# Patient Record
Sex: MALE | Race: WHITE | NOT HISPANIC OR LATINO | Employment: FULL TIME | URBAN - NONMETROPOLITAN AREA
[De-identification: names, ages, dates, MRNs, and addresses within clinical notes are randomized per-mention and may not be internally consistent; named-entity substitution may affect disease eponyms.]

---

## 2023-10-27 DIAGNOSIS — M54.50 LOW BACK PAIN, UNSPECIFIED BACK PAIN LATERALITY, UNSPECIFIED CHRONICITY, UNSPECIFIED WHETHER SCIATICA PRESENT: Primary | ICD-10-CM

## 2024-06-12 ENCOUNTER — OFFICE VISIT (OUTPATIENT)
Dept: PAIN MEDICINE | Facility: CLINIC | Age: 34
End: 2024-06-12
Payer: OTHER MISCELLANEOUS

## 2024-06-12 VITALS — WEIGHT: 172 LBS | HEIGHT: 73 IN | BODY MASS INDEX: 22.8 KG/M2

## 2024-06-12 DIAGNOSIS — M47.817 LUMBOSACRAL SPONDYLOSIS WITHOUT MYELOPATHY: Primary | ICD-10-CM

## 2024-06-12 PROCEDURE — 99203 OFFICE O/P NEW LOW 30 MIN: CPT | Performed by: STUDENT IN AN ORGANIZED HEALTH CARE EDUCATION/TRAINING PROGRAM

## 2024-06-12 RX ORDER — GABAPENTIN 100 MG/1
100 CAPSULE ORAL 2 TIMES DAILY
Qty: 60 CAPSULE | Refills: 1 | Status: SHIPPED | OUTPATIENT
Start: 2024-06-12

## 2024-06-12 NOTE — PROGRESS NOTES
Referring Physician: Angie Lora APRN  [address] [facility] [place] [address]    Primary Physician: Angie Lora APRN    CHIEF COMPLAINT or REASON FOR VISIT: Back Pain (New patient)      Initial history of present illness on 06/12/2024:  Mr. Aydin Lew is 34 y.o. male who presents as a new patient referral for evaluation treatment of chronic left-sided low back pain with radiation to left thigh.  He recently thought he had a hip injury when moving items by a dumpster in October 2023.  He continues to describe left-sided low back pain.  He is never had any spine surgery or intervention.  He has been evaluated by spine surgery, Dr. Ewing, who did not recommend surgery.  He denies any right-sided symptoms.  He has done physical therapy with minimal benefit but the electrical stimulation device is somewhat helpful.  He has been taking ibuprofen with modest benefit.  He does not want to have any kind of injection or procedure as he is concerned about the potential risks.  Patient denies any bowel or bladder dysfunction, lower extremity weakness, new onset saddle anesthesia or unexplained weight loss.       Interval history:    Interventions:      Objective Pain Scoring:   BRIEF PAIN INVENTORY:  Total score:   Pain Score    06/12/24 1125   PainSc:   5   PainLoc: Back      PHQ-2: PHQ-2 Total Score: 4  PHQ-9: PHQ-9: Brief Depression Severity Measure Score: 8  Opioid Risk Tool:         Review of Systems:   ROS negative except as otherwise noted     Past Medical History:   No past medical history on file.      Past Surgical History:   No past surgical history on file.      Family History   No family history on file.      Social History   Social History     Socioeconomic History   • Marital status: Single   Tobacco Use   • Smoking status: Every Day     Current packs/day: 1.00     Average packs/day: 1 pack/day for 13.0 years (13.0 ttl pk-yrs)     Types: Cigarettes     Start date: 6/12/2011   • Smokeless tobacco: Never  "  Vaping Use   • Vaping status: Former        Medications:     Current Outpatient Medications:   •  gabapentin (NEURONTIN) 100 MG capsule, Take 1 capsule by mouth 2 (Two) Times a Day., Disp: 60 capsule, Rfl: 1        Physical Exam:     Vitals:    06/12/24 1125   Weight: 78 kg (172 lb)   Height: 185.4 cm (73\")   PainSc:   5   PainLoc: Back        General: Alert and oriented, No acute distress.   HEENT: Normocephalic, atraumatic.   Cardiovascular: No gross edema  Respiratory: Respirations are non-labored  Lumbar Spine:   No masses or atrophy  Range of motion - Flexion normal. Extension normal.    Facet Loading: Positive left  Facet Palpation -tender left  Casey finger/Gaenslen's/Alex's/COLBY/Thigh thrust -   Straight leg raise/slump test: Negative bilaterally      Motor Exam:    Strength: Rate on 1-5 scale Right Left    C5-Elbow flexion, Deltoid 5/5  5/5    C6-Wrist extension 5/5  5/5    C7- Elbow / finger extension 5/5  5/5    C8- Finger flexion 5/5  5/5    T1- Intrinsics hand 5/5  5/5    Strength: Rate on 1-5 scale Right Left    L1/2- hip flexion 5/5  5/5    L3- knee extension 5/5  5/5    L4- ankle dorsiflexion 5/5  5/5    L5- great toe extension 5/5  5/5    S1- ankle plantarflexion 5/5  5/5    Sensory Exam: Full and equal sensation to light touch throughout.    Neurologic: Cranial Nerves II-XII are grossly intact.     Psychiatric: Cooperative.   Gait: Normal   Assistive Devices: None    Imaging Studies:   No results found for this or any previous visit.        Independent review of radiographic imaging:     Impression & Plan:       06/12/2024: Aydin Lew is a 34 y.o. male with no significant past medical history significant who presents to the pain clinic for evaluation and treatment of chronic left-sided low back pain.  I do not have images for personal interpretation however lumbar MRI indicates primarily right-sided neuroforaminal stenosis.  Evaluation was consistent with a left-sided facet " spondylosis.  He would like to avoid any kind of procedure.  We can try gabapentin for his referred radicular type pain.  Additionally counseled him to use acetaminophen instead of ibuprofen.    1. Lumbosacral spondylosis without myelopathy        PLAN:  1. Medication Recommendations: Recommend Voltaren topical, NSAIDs, Tylenol.  Can trial turmeric 500 mg twice daily if NSAID contraindicated.  -Start gabapentin 100 mg 2 times daily 60 pills 1 refill  -As part of this patient's treatment plan, patient will be prescribed controlled substances. The patient has been made aware of appropriate use of such medications, including potential risk of somnolence, limited ability to drive and /or work safely, and potential for dependence or overdose. It has also been made clear that these medications are for use by this patient only, without concomitant use of alcohol or other substances unless prescribed.Controlled substance status of medication discussed with patient, discussed risks of medication including abuse potential and diversion potential and need to follow up for reevaluation appointment in order to receive further refills.  Min was reviewed and compliant.    2. Physical Therapy: Continue HEP    3. Psychological: defer    4. Complementary and alternative (CAM) Therapies:     5. Labs/Diagnostic studies: None indicated     6. Imaging: None indicated     7. Interventions: Can consider LMBB.  Patient declines at this time.    8. Referrals: None indicated     9. Records: PCP note reviewed    10. Lifestyle goals:    Follow-up 2 months      [facility]  Vince Camarillo MD          Quality Metrics:    Aydin Lew reports a pain score of 5.  Given his pain assessment as noted, treatment options were discussed and the following options were decided upon as a follow-up plan to address the patient's pain: continuation of current treatment plan for pain.

## 2024-08-14 ENCOUNTER — OFFICE VISIT (OUTPATIENT)
Dept: PAIN MEDICINE | Facility: CLINIC | Age: 34
End: 2024-08-14
Payer: OTHER MISCELLANEOUS

## 2024-08-14 VITALS — HEIGHT: 73 IN | WEIGHT: 176.1 LBS | BODY MASS INDEX: 23.34 KG/M2

## 2024-08-14 DIAGNOSIS — Z51.81 THERAPEUTIC DRUG MONITORING: Primary | ICD-10-CM

## 2024-08-14 DIAGNOSIS — M47.817 LUMBOSACRAL SPONDYLOSIS WITHOUT MYELOPATHY: Primary | ICD-10-CM

## 2024-08-14 PROCEDURE — 99213 OFFICE O/P EST LOW 20 MIN: CPT

## 2024-08-14 RX ORDER — HYDROCODONE BITARTRATE AND ACETAMINOPHEN 5; 325 MG/1; MG/1
1 TABLET ORAL AS NEEDED
COMMUNITY

## 2024-08-14 RX ORDER — PREGABALIN 25 MG/1
25 CAPSULE ORAL 2 TIMES DAILY
Qty: 60 CAPSULE | Refills: 1 | Status: SHIPPED | OUTPATIENT
Start: 2024-08-14

## 2024-08-14 RX ORDER — IBUPROFEN 200 MG
200 TABLET ORAL AS NEEDED
COMMUNITY

## 2024-08-14 NOTE — TELEPHONE ENCOUNTER
Discontinue gabapentin   start pregabalin 25 mg twice daily 60 pills #1 refill  Min reviewed and compliant  Controlled substance agreement signed in office  Pending UDS   appropriate follow-up visit scheduled

## 2024-08-14 NOTE — PROGRESS NOTES
Referring Physician: No referring provider defined for this encounter.    Primary Physician: Angie Lora APRN    CHIEF COMPLAINT or REASON FOR VISIT: Follow-up and Back Pain      Initial history of present illness on 06/12/2024:  Mr. Aydin Lew is 34 y.o. male who presents as a new patient referral for evaluation treatment of chronic left-sided low back pain with radiation to left thigh.  He recently thought he had a hip injury when moving items by a dumpster in October 2023.  He continues to describe left-sided low back pain.  He is never had any spine surgery or intervention.  He has been evaluated by spine surgery, Dr. Ewing, who did not recommend surgery.  He denies any right-sided symptoms.  He has done physical therapy with minimal benefit but the electrical stimulation device is somewhat helpful.  He has been taking ibuprofen with modest benefit.  He does not want to have any kind of injection or procedure as he is concerned about the potential risks.  Patient denies any bowel or bladder dysfunction, lower extremity weakness, new onset saddle anesthesia or unexplained weight loss.       Interval history:  Patient returns to clinic today.  He was prescribed gabapentin at his last office visit however he reports he is not able to tolerate this medication due to cognitive side effects.  He has subsequently stopped taking it.  Continues to complain of chronic left-sided low back pain. He denies any new injuries or events since previous appointment.  We discussed a left LMBB and subsequent RFA.  Patient reports he is not interested in any procedures at this time.  Patient denies any bowel or bladder dysfunction, lower extremity weakness, new onset saddle anesthesia or unexplained weight lo    Interventions:      Objective Pain Scoring:   BRIEF PAIN INVENTORY:  Total score:   Pain Score    08/14/24 1456   PainSc:   6   PainLoc: Back      PHQ-2: PHQ-2 Total Score: 3  PHQ-9: PHQ-9: Brief Depression Severity  "Measure Score: 12  Opioid Risk Tool:         Review of Systems:   ROS negative except as otherwise noted     Past Medical History:   History reviewed. No pertinent past medical history.      Past Surgical History:   History reviewed. No pertinent surgical history.      Family History   History reviewed. No pertinent family history.      Social History   Social History     Socioeconomic History   • Marital status: Single   Tobacco Use   • Smoking status: Every Day     Current packs/day: 1.00     Average packs/day: 1 pack/day for 13.2 years (13.2 ttl pk-yrs)     Types: Cigarettes     Start date: 6/12/2011   • Smokeless tobacco: Never   Vaping Use   • Vaping status: Former        Medications:     Current Outpatient Medications:   •  HYDROcodone-acetaminophen (NORCO) 5-325 MG per tablet, Take 1 tablet by mouth As Needed for Mild Pain or Moderate Pain., Disp: , Rfl:   •  ibuprofen (ADVIL,MOTRIN) 200 MG tablet, Take 1 tablet by mouth As Needed for Mild Pain., Disp: , Rfl:         Physical Exam:     Vitals:    08/14/24 1456   Weight: 79.9 kg (176 lb 1.6 oz)   Height: 185.4 cm (72.99\")   PainSc:   6   PainLoc: Back        General: Alert and oriented, No acute distress.   HEENT: Normocephalic, atraumatic.   Cardiovascular: No gross edema  Respiratory: Respirations are non-labored  Lumbar Spine:   No masses or atrophy  Range of motion - Flexion normal. Extension normal.    Facet Loading: Positive left  Facet Palpation -tender left  Casey finger/Gaenslen's/Alex's/COLBY/Thigh thrust -   Straight leg raise/slump test: Negative bilaterally      Motor Exam:    Strength: Rate on 1-5 scale Right Left    C5-Elbow flexion, Deltoid 5/5  5/5    C6-Wrist extension 5/5  5/5    C7- Elbow / finger extension 5/5  5/5    C8- Finger flexion 5/5  5/5    T1- Intrinsics hand 5/5  5/5    Strength: Rate on 1-5 scale Right Left    L1/2- hip flexion 5/5  5/5    L3- knee extension 5/5  5/5    L4- ankle dorsiflexion 5/5  5/5    L5- great " toe extension 5/5  5/5    S1- ankle plantarflexion 5/5  5/5    Sensory Exam: Full and equal sensation to light touch throughout.    Neurologic: Cranial Nerves II-XII are grossly intact.     Psychiatric: Cooperative.   Gait: Normal   Assistive Devices: None    Imaging Studies:   No results found for this or any previous visit.        Independent review of radiographic imaging:     Impression & Plan:       06/12/2024: Aydin Lew is a 34 y.o. male with no significant past medical history significant who presents to the pain clinic for evaluation and treatment of chronic left-sided low back pain.  I do not have images for personal interpretation however lumbar MRI indicates primarily right-sided neuroforaminal stenosis.  Evaluation was consistent with a left-sided facet spondylosis.  He would like to avoid any kind of procedure.  We can try gabapentin for his referred radicular type pain.  Additionally counseled him to use acetaminophen instead of ibuprofen.  8/14/2024: Discontinue gabapentin.  Patient not interested in procedures at this time.  Will start low-dose pregabalin twice daily    1. Lumbosacral spondylosis without myelopathy          PLAN:  1. Medication Recommendations: Recommend Voltaren topical, NSAIDs, Tylenol.  Can trial turmeric 500 mg twice daily if NSAID contraindicated.  -Discontinue gabapentin.  Will contact pharmacy to cancel this medication  -Start pregabalin 25 mg twice daily, 60 pills, #1 refill  -As part of this patient's treatment plan, patient will be prescribed controlled substances. The patient has been made aware of appropriate use of such medications, including potential risk of somnolence, limited ability to drive and /or work safely, and potential for dependence or overdose. It has also been made clear that these medications are for use by this patient only, without concomitant use of alcohol or other substances unless prescribed.Controlled substance status of medication discussed  with patient, discussed risks of medication including abuse potential and diversion potential and need to follow up for reevaluation appointment in order to receive further refills.  Min was reviewed and compliant.    2. Physical Therapy: Continue HEP    3. Psychological: defer    4. Complementary and alternative (CAM) Therapies:     5. Labs/Diagnostic studies: Obtain compliance UDS    6. Imaging: None indicated     7. Interventions: Can consider LMBB.  Patient declines at this time.    8. Referrals: None indicated     9. Records: PCP note reviewed    10. Lifestyle goals:    Follow-up 2 months for medication management in [facility]  Maria Luz Ji PA-C          Quality Metrics:    Aydin Lew reports a pain score of 6.  Given his pain assessment as noted, treatment options were discussed and the following options were decided upon as a follow-up plan to address the patient's pain: continuation of current treatment plan for pain.

## 2024-08-15 ENCOUNTER — TELEPHONE (OUTPATIENT)
Dept: PAIN MEDICINE | Facility: CLINIC | Age: 34
End: 2024-08-15
Payer: OTHER MISCELLANEOUS

## 2024-08-22 ENCOUNTER — CLINICAL SUPPORT (OUTPATIENT)
Dept: FAMILY MEDICINE CLINIC | Facility: CLINIC | Age: 34
End: 2024-08-22
Payer: OTHER MISCELLANEOUS

## 2024-08-22 DIAGNOSIS — Z79.899 ENCOUNTER FOR LONG-TERM (CURRENT) USE OF OTHER MEDICATIONS: Primary | ICD-10-CM

## 2024-08-22 LAB
POC AMPHETAMINES: NEGATIVE
POC BARBITURATES: NEGATIVE
POC BENZODIAZEPHINES: NEGATIVE
POC COCAINE: NEGATIVE
POC METHADONE: NEGATIVE
POC METHAMPHETAMINE SCREEN URINE: NEGATIVE
POC OPIATES: NEGATIVE
POC OXYCODONE: NEGATIVE
POC PHENCYCLIDINE: NEGATIVE
POC PROPOXYPHENE: NEGATIVE
POC THC: POSITIVE
POC TRICYCLIC ANTIDEPRESSANTS: NEGATIVE

## 2024-11-15 ENCOUNTER — OFFICE VISIT (OUTPATIENT)
Dept: PAIN MEDICINE | Facility: CLINIC | Age: 34
End: 2024-11-15
Payer: OTHER MISCELLANEOUS

## 2024-11-15 VITALS — BODY MASS INDEX: 23.34 KG/M2 | WEIGHT: 176.1 LBS | HEIGHT: 73 IN

## 2024-11-15 DIAGNOSIS — M47.817 LUMBOSACRAL SPONDYLOSIS WITHOUT MYELOPATHY: Primary | ICD-10-CM

## 2024-11-15 PROCEDURE — 99213 OFFICE O/P EST LOW 20 MIN: CPT

## 2024-11-15 NOTE — PROGRESS NOTES
Referring Physician: No referring provider defined for this encounter.    Primary Physician: Angie Lora APRN    CHIEF COMPLAINT or REASON FOR VISIT: Follow-up, Back Pain, and Med Management (Not taking Pregabalin due to side effects. )      Initial history of present illness on 06/12/2024:  Mr. Aydin Lew is 34 y.o. male who presents as a new patient referral for evaluation treatment of chronic left-sided low back pain with radiation to left thigh.  He recently thought he had a hip injury when moving items by a dumpster in October 2023.  He continues to describe left-sided low back pain.  He is never had any spine surgery or intervention.  He has been evaluated by spine surgery, Dr. Ewing, who did not recommend surgery.  He denies any right-sided symptoms.  He has done physical therapy with minimal benefit but the electrical stimulation device is somewhat helpful.  He has been taking ibuprofen with modest benefit.  He does not want to have any kind of injection or procedure as he is concerned about the potential risks.  Patient denies any bowel or bladder dysfunction, lower extremity weakness, new onset saddle anesthesia or unexplained weight loss.       Interval history:  Patient returns to clinic today.  He continues to complain of left-sided low back pain with radiation to the left thigh.  He denies any new injuries or events since his previous appointment.  He occasionally takes pregabalin with minimal relief.  He has participated in physical therapy which provided some relief.  Discussed multiple treatment options today including potential lumbar epidural steroid injection, continuing conservative measures with physical therapy, lumbar medial branch blocks and subsequent ablation.  Patient is apprehensive to undergo any procedure/spinal injections at this time.  He expresses interest in continuing physical therapy.  Unfortunately, his UDS did reveal positive THC.  He states he uses THC for ADHD and  "Tourette's.  I did explain to him we are unable to offer additional refills of pregabalin due to current THC use.  He voiced understanding.    Interventions:      Objective Pain Scoring:   BRIEF PAIN INVENTORY:  Total score:   Pain Score    11/15/24 1242   PainSc:   5   PainLoc: Back      PHQ-2:    PHQ-9:    Opioid Risk Tool:         Review of Systems:   ROS negative except as otherwise noted     Past Medical History:   Past Medical History:   Diagnosis Date   • Low back pain 10/3/23    Lower back hurts aches burns all the time         Past Surgical History:   History reviewed. No pertinent surgical history.      Family History   History reviewed. No pertinent family history.      Social History   Social History     Socioeconomic History   • Marital status: Single   Tobacco Use   • Smoking status: Every Day     Current packs/day: 1.00     Average packs/day: 1 pack/day for 13.4 years (13.4 ttl pk-yrs)     Types: Cigarettes     Start date: 6/12/2011   • Smokeless tobacco: Never   • Tobacco comments:     Quit for 5 years and started back about 6 years ago   Vaping Use   • Vaping status: Former   Substance and Sexual Activity   • Alcohol use: Yes     Alcohol/week: 8.0 standard drinks of alcohol     Types: 8 Shots of liquor per week     Comment: Alot of weeks none. Weekend drinker when i do.   • Drug use: Never   • Sexual activity: Yes     Partners: Female     Birth control/protection: Birth control pill        Medications:     Current Outpatient Medications:   •  ibuprofen (ADVIL,MOTRIN) 200 MG tablet, Take 1 tablet by mouth As Needed for Mild Pain., Disp: , Rfl:         Physical Exam:     Vitals:    11/15/24 1242   Weight: 79.9 kg (176 lb 1.6 oz)   Height: 185.4 cm (72.99\")   PainSc:   5   PainLoc: Back        General: Alert and oriented, No acute distress.   HEENT: Normocephalic, atraumatic.   Cardiovascular: No gross edema  Respiratory: Respirations are non-labored  Lumbar Spine:   No masses or atrophy  Range of " motion - Flexion normal. Extension normal.    Facet Loading: Positive left  Facet Palpation -tender left  Casey finger/Gaenslen's/Alex's/COLBY/Thigh thrust -   Straight leg raise/slump test: Negative bilaterally      Motor Exam:    Strength: Rate on 1-5 scale Right Left    C5-Elbow flexion, Deltoid 5/5  5/5    C6-Wrist extension 5/5  5/5    C7- Elbow / finger extension 5/5  5/5    C8- Finger flexion 5/5  5/5    T1- Intrinsics hand 5/5  5/5    Strength: Rate on 1-5 scale Right Left    L1/2- hip flexion 5/5  5/5    L3- knee extension 5/5  5/5    L4- ankle dorsiflexion 5/5  5/5    L5- great toe extension 5/5  5/5    S1- ankle plantarflexion 5/5  5/5    Sensory Exam: Full and equal sensation to light touch throughout.    Neurologic: Cranial Nerves II-XII are grossly intact.     Psychiatric: Cooperative.   Gait: Normal   Assistive Devices: None    Imaging Studies:   No results found for this or any previous visit.        Independent review of radiographic imaging:     Impression & Plan:       06/12/2024: Aydin Lew is a 34 y.o. male with no significant past medical history significant who presents to the pain clinic for evaluation and treatment of chronic left-sided low back pain.  I do not have images for personal interpretation however lumbar MRI indicates primarily right-sided neuroforaminal stenosis.  Evaluation was consistent with a left-sided facet spondylosis.  He would like to avoid any kind of procedure.  We can try gabapentin for his referred radicular type pain.  Additionally counseled him to use acetaminophen instead of ibuprofen.  8/14/2024: Discontinue gabapentin.  Patient not interested in procedures at this time.  Will start low-dose pregabalin twice daily  11/15/2024: Discontinue pregabalin due to current THC use.  Not a candidate for controlled substances at this time.  Patient politely declines any procedures/interventions.  Will continue physical therapy    1. Lumbosacral spondylosis  without myelopathy            PLAN:  1. Medication Recommendations: Recommend Voltaren topical, NSAIDs, Tylenol.  Can trial turmeric 500 mg twice daily if NSAID contraindicated.  -Discontinue pregabalin.  Patient not a candidate for controlled substance at this time due to current THC use    2. Physical Therapy: New PT referral    3. Psychological: defer    4. Complementary and alternative (CAM) Therapies:     5. Labs/Diagnostic studies: UDS from 8/22/2024 reveals positive THC    6. Imaging: None indicated     7. Interventions: Can consider LMBB, LESI.  Patient declines at this time.    8. Referrals: None indicated     9. Records:     10. Lifestyle goals:    Follow-up 6 months      [facility]  Maria [place] Ji PA-C          Quality Metrics:    Aydin Lew reports a pain score of 5.  Given his pain assessment as noted, treatment options were discussed and the following options were decided upon as a follow-up plan to address the patient's pain: continuation of current treatment plan for pain.     Patient screened positive for depression based on a PHQ-9 score of 13 on 11/15/2024. Follow-up recommendations include: PCP managing depression.

## 2025-05-23 ENCOUNTER — OFFICE VISIT (OUTPATIENT)
Dept: PAIN MEDICINE | Facility: CLINIC | Age: 35
End: 2025-05-23
Payer: OTHER MISCELLANEOUS

## 2025-05-23 VITALS — BODY MASS INDEX: 22.66 KG/M2 | WEIGHT: 171 LBS | HEIGHT: 73 IN

## 2025-05-23 DIAGNOSIS — M47.817 LUMBOSACRAL SPONDYLOSIS WITHOUT MYELOPATHY: Primary | ICD-10-CM

## 2025-05-23 PROCEDURE — 99213 OFFICE O/P EST LOW 20 MIN: CPT

## 2025-05-23 RX ORDER — PREGABALIN 25 MG/1
CAPSULE ORAL
COMMUNITY
Start: 2025-05-05

## 2025-05-23 RX ORDER — LISDEXAMFETAMINE DIMESYLATE 60 MG/1
CAPSULE ORAL
COMMUNITY
Start: 2025-05-05

## 2025-05-23 NOTE — PROGRESS NOTES
Referring Physician: No referring provider defined for this encounter.    Primary Physician: Provider, No Known    CHIEF COMPLAINT or REASON FOR VISIT: Back Pain and Follow-up      Initial history of present illness on 06/12/2024:  Mr. Aydin Lew is 35 y.o. male who presents as a new patient referral for evaluation treatment of chronic left-sided low back pain with radiation to left thigh.  He recently thought he had a hip injury when moving items by a dumpster in October 2023.  He continues to describe left-sided low back pain.  He is never had any spine surgery or intervention.  He has been evaluated by spine surgery, Dr. Ewing, who did not recommend surgery.  He denies any right-sided symptoms.  He has done physical therapy with minimal benefit but the electrical stimulation device is somewhat helpful.  He has been taking ibuprofen with modest benefit.  He does not want to have any kind of injection or procedure as he is concerned about the potential risks.  Patient denies any bowel or bladder dysfunction, lower extremity weakness, new onset saddle anesthesia or unexplained weight loss.       Interval history:  Patient returns to clinic today.  He is frustrated with his care.  He was frustrated that pregabalin was discontinued at his last office visit due to THC use.  At his previous office visits we did discuss interventions however he was not interested in those procedures at that time.  Today, patient reports that he no longer wants to receive care at this clinic and ended his office visit.  He does state that he has received his medical marijuana card and is taking pregabalin.  Unfortunately, we did not have the opportunity to discuss interventions today as he did leave during the middle of consultation.  Additionally, patient was frustrated that his follow-up visit was scheduled about 6 months    Interventions:      Objective Pain Scoring:   BRIEF PAIN INVENTORY:  Total score:   Pain Score    05/23/25  "1252   PainSc: 7    PainLoc: Back      PHQ-2:    PHQ-9:    Opioid Risk Tool:         Review of Systems:   ROS negative except as otherwise noted     Past Medical History:   Past Medical History:   Diagnosis Date   • Low back pain 10/3/23    Lower back hurts aches burns all the time         Past Surgical History:   History reviewed. No pertinent surgical history.      Family History   History reviewed. No pertinent family history.      Social History   Social History     Socioeconomic History   • Marital status: Single   Tobacco Use   • Smoking status: Every Day     Current packs/day: 1.00     Average packs/day: 1 pack/day for 13.9 years (13.9 ttl pk-yrs)     Types: Cigarettes     Start date: 6/12/2011   • Smokeless tobacco: Never   • Tobacco comments:     Quit for 5 years and started back about 6 years ago   Vaping Use   • Vaping status: Former   Substance and Sexual Activity   • Alcohol use: Yes     Alcohol/week: 8.0 standard drinks of alcohol     Types: 8 Shots of liquor per week     Comment: Alot of weeks none. Weekend drinker when i do.   • Drug use: Yes     Frequency: 7.0 times per week     Types: Marijuana     Comment: Marijuana card ([employer]) # [id number] 3/3/2025   • Sexual activity: Yes     Partners: Female     Birth control/protection: Birth control pill        Medications:     Current Outpatient Medications:   •  ibuprofen (ADVIL,MOTRIN) 200 MG tablet, Take 1 tablet by mouth As Needed for Mild Pain., Disp: , Rfl:   •  lisdexamfetamine (VYVANSE) 60 MG capsule, TAKE 1 CAPSULE BY MOUTH ONCE DAILY IN THE MORNING FOR ADHD, Disp: , Rfl:   •  pregabalin (LYRICA) 25 MG capsule, TAKE 1 CAPSULE BY MOUTH ONCE DAILY AS DIRECTED FOR 30 DAYS, Disp: , Rfl:         Physical Exam:     Vitals:    05/23/25 1252   Weight: 77.6 kg (171 lb)   Height: 185.4 cm (73\")   PainSc: 7    PainLoc: Back        General: Alert and oriented, No acute distress.   HEENT: Normocephalic, atraumatic.   Cardiovascular: No gross " edema  Respiratory: Respirations are non-labored    Unable to perform physical exam today as patient left during the middle of consultation.     Imaging Studies:   No results found for this or any previous visit.        Independent review of radiographic imaging:     Impression & Plan:       06/12/2024: Aydin Lew is a 35 y.o. male with no significant past medical history significant who presents to the pain clinic for evaluation and treatment of chronic left-sided low back pain.  I do not have images for personal interpretation however lumbar MRI indicates primarily right-sided neuroforaminal stenosis.  Evaluation was consistent with a left-sided facet spondylosis.  He would like to avoid any kind of procedure.  We can try gabapentin for his referred radicular type pain.  Additionally counseled him to use acetaminophen instead of ibuprofen.  8/14/2024: Discontinue gabapentin.  Patient not interested in procedures at this time.  Will start low-dose pregabalin twice daily  11/15/2024: Discontinue pregabalin due to current THC use.  Not a candidate for controlled substances at this time.  Patient politely declines any procedures/interventions.  Will continue physical therapy  5/23/2025: Patient was frustrated with his care and decided to no longer follow with this clinic for pain.    1. Lumbosacral spondylosis without myelopathy              PLAN:  1. Medication Recommendations: Recommend Voltaren topical, NSAIDs, Tylenol.  Can trial turmeric 500 mg twice daily if NSAID contraindicated.  -Was previously prescribed pregabalin however this was discontinued at his last office visit due to positive THC on urine drug screen from 8/22/2024..  Today, patient does report that he has his medical marijuana card.  - He is currently prescribed pregabalin 25 mg twice daily as well as Advil/ibuprofen.    2. Physical Therapy:     3. Psychological: defer    4. Complementary and alternative (CAM) Therapies:     5. Labs/Diagnostic  studies: UDS from 8/22/2024 reveals positive THC    6. Imaging: None indicated     7. Interventions: Previously discussed LMDESTINI, LESI.  Patient did decline these interventions at previous office visits.  Unfortunately today we are unable to discuss any additional procedures/interventions as he left during consultation.     8. Referrals: None indicated     9. Records:     10. Lifestyle goals:    Follow-up as needed      [facility]  Maria Luz Ji PA-C          Quality Metrics:    Aydin Lew reports a pain score of 7.  Given his pain assessment as noted, treatment options were discussed and the following options were decided upon as a follow-up plan to address the patient's pain: continuation of current treatment plan for pain.     Patient screened positive for depression based on a PHQ-9 score of 11 on 5/23/2025. Follow-up recommendations include: PCP managing depression.